# Patient Record
Sex: FEMALE | Race: ASIAN | NOT HISPANIC OR LATINO | URBAN - METROPOLITAN AREA
[De-identification: names, ages, dates, MRNs, and addresses within clinical notes are randomized per-mention and may not be internally consistent; named-entity substitution may affect disease eponyms.]

---

## 2017-02-06 ENCOUNTER — OUTPATIENT (OUTPATIENT)
Dept: OUTPATIENT SERVICES | Facility: HOSPITAL | Age: 52
LOS: 1 days | End: 2017-02-06
Payer: COMMERCIAL

## 2017-02-06 ENCOUNTER — APPOINTMENT (OUTPATIENT)
Dept: MAMMOGRAPHY | Facility: IMAGING CENTER | Age: 52
End: 2017-02-06

## 2017-02-06 DIAGNOSIS — J33.0 POLYP OF NASAL CAVITY: Chronic | ICD-10-CM

## 2017-02-06 DIAGNOSIS — Z12.31 ENCOUNTER FOR SCREENING MAMMOGRAM FOR MALIGNANT NEOPLASM OF BREAST: ICD-10-CM

## 2017-02-06 PROCEDURE — 77067 SCR MAMMO BI INCL CAD: CPT

## 2017-02-06 PROCEDURE — 77063 BREAST TOMOSYNTHESIS BI: CPT

## 2017-04-26 ENCOUNTER — APPOINTMENT (OUTPATIENT)
Dept: RADIOLOGY | Facility: CLINIC | Age: 52
End: 2017-04-26

## 2017-04-26 ENCOUNTER — OUTPATIENT (OUTPATIENT)
Dept: OUTPATIENT SERVICES | Facility: HOSPITAL | Age: 52
LOS: 1 days | End: 2017-04-26
Payer: COMMERCIAL

## 2017-04-26 DIAGNOSIS — J33.0 POLYP OF NASAL CAVITY: Chronic | ICD-10-CM

## 2017-04-26 DIAGNOSIS — Z00.8 ENCOUNTER FOR OTHER GENERAL EXAMINATION: ICD-10-CM

## 2017-04-26 PROCEDURE — 77080 DXA BONE DENSITY AXIAL: CPT

## 2018-01-25 ENCOUNTER — APPOINTMENT (OUTPATIENT)
Dept: RADIOLOGY | Facility: CLINIC | Age: 53
End: 2018-01-25
Payer: COMMERCIAL

## 2018-01-25 ENCOUNTER — OUTPATIENT (OUTPATIENT)
Dept: OUTPATIENT SERVICES | Facility: HOSPITAL | Age: 53
LOS: 1 days | End: 2018-01-25
Payer: COMMERCIAL

## 2018-01-25 DIAGNOSIS — Z00.8 ENCOUNTER FOR OTHER GENERAL EXAMINATION: ICD-10-CM

## 2018-01-25 DIAGNOSIS — J33.0 POLYP OF NASAL CAVITY: Chronic | ICD-10-CM

## 2018-01-25 PROCEDURE — 71046 X-RAY EXAM CHEST 2 VIEWS: CPT

## 2018-01-25 PROCEDURE — 71046 X-RAY EXAM CHEST 2 VIEWS: CPT | Mod: 26

## 2018-02-08 ENCOUNTER — TRANSCRIPTION ENCOUNTER (OUTPATIENT)
Age: 53
End: 2018-02-08

## 2018-02-10 ENCOUNTER — APPOINTMENT (OUTPATIENT)
Dept: MAMMOGRAPHY | Facility: CLINIC | Age: 53
End: 2018-02-10
Payer: COMMERCIAL

## 2018-02-10 ENCOUNTER — OUTPATIENT (OUTPATIENT)
Dept: OUTPATIENT SERVICES | Facility: HOSPITAL | Age: 53
LOS: 1 days | End: 2018-02-10
Payer: COMMERCIAL

## 2018-02-10 DIAGNOSIS — J33.0 POLYP OF NASAL CAVITY: Chronic | ICD-10-CM

## 2018-02-10 DIAGNOSIS — Z12.31 ENCOUNTER FOR SCREENING MAMMOGRAM FOR MALIGNANT NEOPLASM OF BREAST: ICD-10-CM

## 2018-02-10 PROCEDURE — 77067 SCR MAMMO BI INCL CAD: CPT | Mod: 26

## 2018-02-10 PROCEDURE — 77063 BREAST TOMOSYNTHESIS BI: CPT | Mod: 26

## 2018-02-10 PROCEDURE — 77063 BREAST TOMOSYNTHESIS BI: CPT

## 2018-02-10 PROCEDURE — 77067 SCR MAMMO BI INCL CAD: CPT

## 2018-11-20 ENCOUNTER — APPOINTMENT (OUTPATIENT)
Dept: ULTRASOUND IMAGING | Facility: CLINIC | Age: 53
End: 2018-11-20
Payer: COMMERCIAL

## 2018-11-20 ENCOUNTER — OUTPATIENT (OUTPATIENT)
Dept: OUTPATIENT SERVICES | Facility: HOSPITAL | Age: 53
LOS: 1 days | End: 2018-11-20
Payer: COMMERCIAL

## 2018-11-20 DIAGNOSIS — E04.2 NONTOXIC MULTINODULAR GOITER: ICD-10-CM

## 2018-11-20 DIAGNOSIS — J33.0 POLYP OF NASAL CAVITY: Chronic | ICD-10-CM

## 2018-11-20 PROCEDURE — 76536 US EXAM OF HEAD AND NECK: CPT | Mod: 26

## 2018-11-20 PROCEDURE — 76536 US EXAM OF HEAD AND NECK: CPT

## 2019-02-15 ENCOUNTER — OUTPATIENT (OUTPATIENT)
Dept: OUTPATIENT SERVICES | Facility: HOSPITAL | Age: 54
LOS: 1 days | End: 2019-02-15
Payer: COMMERCIAL

## 2019-02-15 ENCOUNTER — APPOINTMENT (OUTPATIENT)
Dept: MAMMOGRAPHY | Facility: CLINIC | Age: 54
End: 2019-02-15
Payer: COMMERCIAL

## 2019-02-15 DIAGNOSIS — J33.0 POLYP OF NASAL CAVITY: Chronic | ICD-10-CM

## 2019-02-15 DIAGNOSIS — Z00.8 ENCOUNTER FOR OTHER GENERAL EXAMINATION: ICD-10-CM

## 2019-02-15 PROCEDURE — 77063 BREAST TOMOSYNTHESIS BI: CPT

## 2019-02-15 PROCEDURE — 77063 BREAST TOMOSYNTHESIS BI: CPT | Mod: 26

## 2019-02-15 PROCEDURE — 77067 SCR MAMMO BI INCL CAD: CPT

## 2019-02-15 PROCEDURE — 77067 SCR MAMMO BI INCL CAD: CPT | Mod: 26

## 2020-02-18 ENCOUNTER — OUTPATIENT (OUTPATIENT)
Dept: OUTPATIENT SERVICES | Facility: HOSPITAL | Age: 55
LOS: 1 days | End: 2020-02-18
Payer: COMMERCIAL

## 2020-02-18 ENCOUNTER — APPOINTMENT (OUTPATIENT)
Dept: MAMMOGRAPHY | Facility: CLINIC | Age: 55
End: 2020-02-18
Payer: COMMERCIAL

## 2020-02-18 DIAGNOSIS — Z00.8 ENCOUNTER FOR OTHER GENERAL EXAMINATION: ICD-10-CM

## 2020-02-18 DIAGNOSIS — J33.0 POLYP OF NASAL CAVITY: Chronic | ICD-10-CM

## 2020-02-18 PROCEDURE — 77067 SCR MAMMO BI INCL CAD: CPT | Mod: 26

## 2020-02-18 PROCEDURE — 77067 SCR MAMMO BI INCL CAD: CPT

## 2020-02-18 PROCEDURE — 77063 BREAST TOMOSYNTHESIS BI: CPT | Mod: 26

## 2020-02-18 PROCEDURE — 77063 BREAST TOMOSYNTHESIS BI: CPT

## 2020-06-19 ENCOUNTER — APPOINTMENT (OUTPATIENT)
Dept: ULTRASOUND IMAGING | Facility: CLINIC | Age: 55
End: 2020-06-19
Payer: COMMERCIAL

## 2020-06-19 ENCOUNTER — OUTPATIENT (OUTPATIENT)
Dept: OUTPATIENT SERVICES | Facility: HOSPITAL | Age: 55
LOS: 1 days | End: 2020-06-19
Payer: COMMERCIAL

## 2020-06-19 DIAGNOSIS — Z00.8 ENCOUNTER FOR OTHER GENERAL EXAMINATION: ICD-10-CM

## 2020-06-19 DIAGNOSIS — J33.0 POLYP OF NASAL CAVITY: Chronic | ICD-10-CM

## 2020-06-19 PROCEDURE — 76700 US EXAM ABDOM COMPLETE: CPT | Mod: 26

## 2020-06-19 PROCEDURE — 76700 US EXAM ABDOM COMPLETE: CPT

## 2021-03-20 ENCOUNTER — APPOINTMENT (OUTPATIENT)
Dept: MAMMOGRAPHY | Facility: CLINIC | Age: 56
End: 2021-03-20
Payer: COMMERCIAL

## 2021-03-20 ENCOUNTER — OUTPATIENT (OUTPATIENT)
Dept: OUTPATIENT SERVICES | Facility: HOSPITAL | Age: 56
LOS: 1 days | End: 2021-03-20
Payer: COMMERCIAL

## 2021-03-20 DIAGNOSIS — Z00.8 ENCOUNTER FOR OTHER GENERAL EXAMINATION: ICD-10-CM

## 2021-03-20 DIAGNOSIS — J33.0 POLYP OF NASAL CAVITY: Chronic | ICD-10-CM

## 2021-03-20 PROCEDURE — 77067 SCR MAMMO BI INCL CAD: CPT

## 2021-03-20 PROCEDURE — 77067 SCR MAMMO BI INCL CAD: CPT | Mod: 26

## 2021-03-20 PROCEDURE — 77063 BREAST TOMOSYNTHESIS BI: CPT

## 2021-03-20 PROCEDURE — 77063 BREAST TOMOSYNTHESIS BI: CPT | Mod: 26

## 2021-12-16 ENCOUNTER — APPOINTMENT (OUTPATIENT)
Dept: SURGERY | Facility: CLINIC | Age: 56
End: 2021-12-16
Payer: COMMERCIAL

## 2021-12-16 VITALS
WEIGHT: 164 LBS | HEIGHT: 64 IN | DIASTOLIC BLOOD PRESSURE: 90 MMHG | SYSTOLIC BLOOD PRESSURE: 133 MMHG | BODY MASS INDEX: 28 KG/M2 | HEART RATE: 98 BPM

## 2021-12-16 DIAGNOSIS — Z80.3 FAMILY HISTORY OF MALIGNANT NEOPLASM OF BREAST: ICD-10-CM

## 2021-12-16 DIAGNOSIS — Z86.39 PERSONAL HISTORY OF OTHER ENDOCRINE, NUTRITIONAL AND METABOLIC DISEASE: ICD-10-CM

## 2021-12-16 DIAGNOSIS — Z80.8 FAMILY HISTORY OF MALIGNANT NEOPLASM OF OTHER ORGANS OR SYSTEMS: ICD-10-CM

## 2021-12-16 DIAGNOSIS — Z86.79 PERSONAL HISTORY OF OTHER DISEASES OF THE CIRCULATORY SYSTEM: ICD-10-CM

## 2021-12-16 DIAGNOSIS — Z78.9 OTHER SPECIFIED HEALTH STATUS: ICD-10-CM

## 2021-12-16 DIAGNOSIS — Z87.09 PERSONAL HISTORY OF OTHER DISEASES OF THE RESPIRATORY SYSTEM: ICD-10-CM

## 2021-12-16 PROCEDURE — 99203 OFFICE O/P NEW LOW 30 MIN: CPT

## 2021-12-16 RX ORDER — GLIMEPIRIDE 2 MG/1
2 TABLET ORAL
Refills: 0 | Status: ACTIVE | COMMUNITY

## 2021-12-16 RX ORDER — FLUTICASONE FUROATE AND VILANTEROL TRIFENATATE 200; 25 UG/1; UG/1
200-25 POWDER RESPIRATORY (INHALATION)
Refills: 0 | Status: ACTIVE | COMMUNITY

## 2021-12-16 RX ORDER — SIMVASTATIN 10 MG/1
10 TABLET, FILM COATED ORAL
Refills: 0 | Status: ACTIVE | COMMUNITY

## 2021-12-16 RX ORDER — ALENDRONATE SODIUM 70 MG/1
70 TABLET ORAL
Refills: 0 | Status: ACTIVE | COMMUNITY

## 2021-12-16 RX ORDER — QUINAPRIL HYDROCHLORIDE 20 MG/1
20 TABLET, FILM COATED ORAL
Refills: 0 | Status: ACTIVE | COMMUNITY

## 2021-12-16 RX ORDER — MONTELUKAST SODIUM 10 MG/1
10 TABLET, FILM COATED ORAL
Refills: 0 | Status: ACTIVE | COMMUNITY

## 2021-12-16 RX ORDER — EMPAGLIFLOZIN 10 MG/1
10 TABLET, FILM COATED ORAL
Refills: 0 | Status: ACTIVE | COMMUNITY

## 2021-12-16 RX ORDER — HYDROCHLOROTHIAZIDE 12.5 MG/1
12.5 CAPSULE ORAL
Refills: 0 | Status: ACTIVE | COMMUNITY

## 2021-12-16 RX ORDER — AMLODIPINE BESYLATE 5 MG/1
5 TABLET ORAL
Refills: 0 | Status: ACTIVE | COMMUNITY

## 2021-12-16 RX ORDER — SITAGLIPTIN AND METFORMIN HYDROCHLORIDE 50; 1000 MG/1; MG/1
50-1000 TABLET, FILM COATED, EXTENDED RELEASE ORAL
Refills: 0 | Status: ACTIVE | COMMUNITY

## 2021-12-18 NOTE — PHYSICAL EXAM
[de-identified] : no palpable thyroid nodules [Laryngoscopy Performed] : laryngoscopy was performed, see procedure section for findings [Midline] : located in midline position [Normal] : orientation to person, place, and time: normal [de-identified] : 4 mm left preauricular mass, well circumscribed, oblong, mobile [de-identified] : indirect  laryngoscopy shows normal vocal cord mobility bilaterally with no lesions noted

## 2021-12-18 NOTE — HISTORY OF PRESENT ILLNESS
[de-identified] : Pt c/o parotid mass for 1 month which has decreased in size.  denies pain,  dysphagia or dry mouth, fever, night sweats, skin cancer excision, infections. \par sonogram:  left parotid mass 9 mm\par MRI:  left 6 mm parotid mass\par I have reviewed all old and new data and available images.\par

## 2021-12-18 NOTE — CONSULT LETTER
[Dear  ___] : Dear  [unfilled], [Consult Letter:] : I had the pleasure of evaluating your patient, [unfilled]. [Please see my note below.] : Please see my note below. [Consult Closing:] : Thank you very much for allowing me to participate in the care of this patient.  If you have any questions, please do not hesitate to contact me. [Sincerely,] : Sincerely, [FreeTextEntry2] : Dr. Fabrice Neri [FreeTextEntry3] : Abdelrahman Kimble MD, FACS\par System Director, Endocrine Surgery\par [facility]\par Assistant Clinical Professor of Surgery\par [employer] [facility] [employer] [facility]

## 2021-12-18 NOTE — ASSESSMENT
[FreeTextEntry1] : recommended observation.  nodule is probably  too small to perform fine needle aspiration cytology. requested sonogram next visit. to return earlier if any change. to apply warm compresses. patient has been given the opportunity to ask questions, and all of the patient's questions have been answered to their satisfaction

## 2022-02-26 ENCOUNTER — APPOINTMENT (OUTPATIENT)
Dept: ULTRASOUND IMAGING | Facility: CLINIC | Age: 57
End: 2022-02-26
Payer: COMMERCIAL

## 2022-02-26 PROCEDURE — 76536 US EXAM OF HEAD AND NECK: CPT

## 2022-03-17 ENCOUNTER — APPOINTMENT (OUTPATIENT)
Dept: SURGERY | Facility: CLINIC | Age: 57
End: 2022-03-17

## 2022-03-22 ENCOUNTER — APPOINTMENT (OUTPATIENT)
Dept: MAMMOGRAPHY | Facility: CLINIC | Age: 57
End: 2022-03-22

## 2022-03-31 ENCOUNTER — APPOINTMENT (OUTPATIENT)
Dept: SURGERY | Facility: CLINIC | Age: 57
End: 2022-03-31
Payer: COMMERCIAL

## 2022-03-31 DIAGNOSIS — K11.8 OTHER DISEASES OF SALIVARY GLANDS: ICD-10-CM

## 2022-03-31 PROCEDURE — 99442: CPT

## 2022-03-31 NOTE — REASON FOR VISIT
[Home] : at home, [unfilled] , at the time of the visit. [Medical Office: ([facility])___] : at the medical office located in  [Family Member] : family member [Verbal consent obtained from patient] : the patient, [unfilled] [FreeTextEntry3] : daughter - Elizabeth [Follow-Up: _____] : a [unfilled] follow-up visit

## 2022-03-31 NOTE — ASSESSMENT
[FreeTextEntry1] : suspect intraparotid node which has since resolved. no intervention necessary.  to return as needed. patient has been given the opportunity to ask questions, and all of the patient's questions have been answered to their satisfaction\par

## 2022-03-31 NOTE — HISTORY OF PRESENT ILLNESS
[de-identified] : t/c from pt inquiring about telehealth to avoid face to face visit. before the visit, the patient and daughter were informed about the limitations and ramifications of telehealth and the potential for HIPAA non compliance.  the patient was at home and daughter was present on the call.  they then agreed to proceed with the visit.\par prior evaluation of parotid mass.  has decreased in size and is no longer palpable.  no changes medically since last visit.  no infections of symptoms related to salivary gland. I have reviewed all old and new data and available images.  Additional information was obtained from others present at the time of visit to ensure the completeness of the history. sonogram does not show any masses\par \par

## 2022-05-05 ENCOUNTER — OUTPATIENT (OUTPATIENT)
Dept: OUTPATIENT SERVICES | Facility: HOSPITAL | Age: 57
LOS: 1 days | End: 2022-05-05
Payer: COMMERCIAL

## 2022-05-05 ENCOUNTER — APPOINTMENT (OUTPATIENT)
Dept: MAMMOGRAPHY | Facility: CLINIC | Age: 57
End: 2022-05-05
Payer: COMMERCIAL

## 2022-05-05 DIAGNOSIS — J33.0 POLYP OF NASAL CAVITY: Chronic | ICD-10-CM

## 2022-05-05 DIAGNOSIS — Z00.00 ENCOUNTER FOR GENERAL ADULT MEDICAL EXAMINATION WITHOUT ABNORMAL FINDINGS: ICD-10-CM

## 2022-05-05 PROCEDURE — 77063 BREAST TOMOSYNTHESIS BI: CPT | Mod: 26

## 2022-05-05 PROCEDURE — 77067 SCR MAMMO BI INCL CAD: CPT

## 2022-05-05 PROCEDURE — 77063 BREAST TOMOSYNTHESIS BI: CPT

## 2022-05-05 PROCEDURE — 77067 SCR MAMMO BI INCL CAD: CPT | Mod: 26

## 2022-11-19 ENCOUNTER — OFFICE (OUTPATIENT)
Dept: URBAN - METROPOLITAN AREA CLINIC 35 | Facility: CLINIC | Age: 57
Setting detail: OPHTHALMOLOGY
End: 2022-11-19
Payer: COMMERCIAL

## 2022-11-19 DIAGNOSIS — H11.153: ICD-10-CM

## 2022-11-19 DIAGNOSIS — E11.9: ICD-10-CM

## 2022-11-19 DIAGNOSIS — H25.13: ICD-10-CM

## 2022-11-19 DIAGNOSIS — H16.221: ICD-10-CM

## 2022-11-19 DIAGNOSIS — H40.013: ICD-10-CM

## 2022-11-19 DIAGNOSIS — D31.02: ICD-10-CM

## 2022-11-19 PROCEDURE — 92014 COMPRE OPH EXAM EST PT 1/>: CPT | Performed by: OPHTHALMOLOGY

## 2022-11-19 PROCEDURE — 92083 EXTENDED VISUAL FIELD XM: CPT | Performed by: OPHTHALMOLOGY

## 2022-11-19 PROCEDURE — 92133 CPTRZD OPH DX IMG PST SGM ON: CPT | Performed by: OPHTHALMOLOGY

## 2022-11-19 ASSESSMENT — KERATOMETRY
OD_AXISANGLE_DEGREES: 013
OS_K1POWER_DIOPTERS: 43.25
OD_K1POWER_DIOPTERS: 43.25
OD_K2POWER_DIOPTERS: 44.00
OS_AXISANGLE_DEGREES: 090
OS_K2POWER_DIOPTERS: 43.25

## 2022-11-19 ASSESSMENT — SPHEQUIV_DERIVED
OD_SPHEQUIV: -0.25
OS_SPHEQUIV: -0.375
OS_SPHEQUIV: -0.375
OS_SPHEQUIV: -0.125
OD_SPHEQUIV: 0
OS_SPHEQUIV: 0.125
OD_SPHEQUIV: -0.25
OD_SPHEQUIV: -0.75
OD_SPHEQUIV: -0.375
OS_SPHEQUIV: -0.25
OD_SPHEQUIV: -0.5
OS_SPHEQUIV: -0.125

## 2022-11-19 ASSESSMENT — REFRACTION_CURRENTRX
OS_SPHERE: +0.50
OD_CYLINDER: -1.50
OD_OVR_VA: 20/
OS_CYLINDER: -1.00
OD_CYLINDER: -1.00
OD_ADD: +2.50
OS_AXIS: 098
OS_OVR_VA: 20/
OS_AXIS: 079
OD_VPRISM_DIRECTION: SV
OS_CYLINDER: -1.25
OS_SPHERE: +0.50
OD_SPHERE: +0.25
OD_VPRISM_DIRECTION: PROGS
OD_SPHERE: +0.50
OS_VPRISM_DIRECTION: SV
OS_VPRISM_DIRECTION: PROGS
OD_AXIS: 088
OS_ADD: +2.50
OD_OVR_VA: 20/
OS_OVR_VA: 20/
OD_AXIS: 089

## 2022-11-19 ASSESSMENT — REFRACTION_MANIFEST
OD_AXIS: 090
OS_SPHERE: +0.50
OD_AXIS: 090
OD_VA1: 20/30+
OS_VA1: 20/50+2
OS_SPHERE: +1.00
OS_AXIS: 100
OS_SPHERE: +0.50
OD_VA1: 20/20-1
OD_SPHERE: +0.50
OD_SPHERE: +0.50
OS_AXIS: 100
OD_VA1: 20/25
OD_CYLINDER: -1.00
OD_VA1: 20/25
OD_SPHERE: -0.25
OS_AXIS: 092
OS_SPHERE: +0.50
OS_ADD: +2.50
OS_AXIS: 000
OS_CYLINDER: -1.25
OD_ADD: +2.50
OD_CYLINDER: -1.50
OS_SPHERE: -1.25
OD_CYLINDER: +2.00
OS_CYLINDER: -1.50
OS_AXIS: 090
OD_SPHERE: -1.50
OD_SPHERE: +0.50
OD_AXIS: 094
OD_CYLINDER: -1.00
OD_CYLINDER: -1.50
OD_AXIS: 90
OS_CYLINDER: -1.25
OS_VA1: 20/25-2
OS_CYLINDER: -1.75
OS_VA1: 20/25-
OS_CYLINDER: +1.75
OS_VA1: 20/20
OD_AXIS: 008

## 2022-11-19 ASSESSMENT — PACHYMETRY
OD_CT_UM: 570
OS_CT_CORRECTION: -2
OS_CT_UM: 579
OD_CT_CORRECTION: -2

## 2022-11-19 ASSESSMENT — AXIALLENGTH_DERIVED
OS_AL: 23.7333
OD_AL: 23.5461
OD_AL: 23.6435
OD_AL: 23.6435
OD_AL: 23.7417
OD_AL: 23.6925
OS_AL: 23.8323
OS_AL: 23.7827
OS_AL: 23.8323
OD_AL: 23.8408
OS_AL: 23.6351
OS_AL: 23.7333

## 2022-11-19 ASSESSMENT — REFRACTION_AUTOREFRACTION
OS_SPHERE: +0.50
OD_CYLINDER: -1.75
OD_AXIS: 098
OS_AXIS: 091
OD_SPHERE: +0.50
OS_CYLINDER: -1.75

## 2022-11-19 ASSESSMENT — VISUAL ACUITY
OD_BCVA: 20/50-1
OS_BCVA: 20/30-1

## 2022-11-19 ASSESSMENT — CONFRONTATIONAL VISUAL FIELD TEST (CVF)
OS_FINDINGS: FULL
OD_FINDINGS: FULL

## 2022-11-19 ASSESSMENT — TONOMETRY
OD_IOP_MMHG: 20
OS_IOP_MMHG: 20

## 2023-03-18 ENCOUNTER — OFFICE (OUTPATIENT)
Dept: URBAN - METROPOLITAN AREA CLINIC 35 | Facility: CLINIC | Age: 58
Setting detail: OPHTHALMOLOGY
End: 2023-03-18
Payer: COMMERCIAL

## 2023-03-18 DIAGNOSIS — H11.153: ICD-10-CM

## 2023-03-18 DIAGNOSIS — H25.13: ICD-10-CM

## 2023-03-18 DIAGNOSIS — H17.9: ICD-10-CM

## 2023-03-18 DIAGNOSIS — H40.013: ICD-10-CM

## 2023-03-18 DIAGNOSIS — D31.02: ICD-10-CM

## 2023-03-18 PROCEDURE — 92012 INTRM OPH EXAM EST PATIENT: CPT | Performed by: OPHTHALMOLOGY

## 2023-03-18 ASSESSMENT — REFRACTION_CURRENTRX
OD_VPRISM_DIRECTION: SV
OS_OVR_VA: 20/
OD_VPRISM_DIRECTION: SV
OD_OVR_VA: 20/
OS_SPHERE: +0.25
OS_OVR_VA: 20/
OS_VPRISM_DIRECTION: SV
OS_AXIS: 077
OD_CYLINDER: -1.50
OS_VPRISM_DIRECTION: SV
OD_OVR_VA: 20/
OD_AXIS: 094
OS_CYLINDER: -1.25
OD_SPHERE: PLANO

## 2023-03-18 ASSESSMENT — REFRACTION_MANIFEST
OS_SPHERE: +1.00
OS_AXIS: 000
OD_AXIS: 090
OS_VA1: 20/25-
OD_SPHERE: -1.50
OS_ADD: +2.75
OD_CYLINDER: -1.75
OD_VA1: 20/20-1
OD_SPHERE: +0.50
OS_AXIS: 090
OS_CYLINDER: -1.25
OS_CYLINDER: -1.75
OD_AXIS: 090
OD_AXIS: 090
OS_SPHERE: +0.50
OS_CYLINDER: -1.50
OD_CYLINDER: -1.50
OD_SPHERE: +0.50
OS_AXIS: 100
OS_CYLINDER: +1.75
OS_VA1: 20/50+2
OD_VA1: 20/25
OD_CYLINDER: -1.00
OS_VA1: 20/20
OD_SPHERE: +0.50
OD_AXIS: 094
OS_AXIS: 090
OD_CYLINDER: -1.00
OS_SPHERE: +0.50
OD_AXIS: 008
OD_VA1: 20/25-2
OD_ADD: +2.75
OD_VA1: 20/30+
OD_SPHERE: -0.25
OS_SPHERE: +0.50
OS_VA1: 20/25-2
OS_AXIS: 100
OS_CYLINDER: -1.25
OS_ADD: +2.50
OD_CYLINDER: -1.50
OS_CYLINDER: -1.50
OD_ADD: +2.50
OS_SPHERE: -1.25
OD_VA1: 20/25
OD_CYLINDER: +2.00
OS_AXIS: 092
OD_AXIS: 90
OS_SPHERE: +0.50
OS_VA1: 20/30-2
OD_SPHERE: +0.50

## 2023-03-18 ASSESSMENT — AXIALLENGTH_DERIVED
OD_AL: 23.9349
OS_AL: 23.7827
OS_AL: 23.7333
OS_AL: 23.8323
OD_AL: 23.7855
OS_AL: 23.6351
OS_AL: 23.7827
OD_AL: 23.6379
OS_AL: 23.7827
OD_AL: 23.7361
OD_AL: 23.7855
OS_AL: 23.7333
OD_AL: 23.7361
OD_AL: 23.8351

## 2023-03-18 ASSESSMENT — KERATOMETRY
OD_K2POWER_DIOPTERS: 44.00
OD_AXISANGLE_DEGREES: 006
OD_K1POWER_DIOPTERS: 42.75
OS_K1POWER_DIOPTERS: 43.25
OS_AXISANGLE_DEGREES: 090
OS_K2POWER_DIOPTERS: 43.25

## 2023-03-18 ASSESSMENT — REFRACTION_AUTOREFRACTION
OD_AXIS: 091
OS_AXIS: 089
OS_SPHERE: +0.50
OS_CYLINDER: -1.50
OD_SPHERE: +0.75
OD_CYLINDER: -2.25

## 2023-03-18 ASSESSMENT — SPHEQUIV_DERIVED
OS_SPHEQUIV: -0.375
OD_SPHEQUIV: 0
OD_SPHEQUIV: -0.75
OD_SPHEQUIV: -0.25
OS_SPHEQUIV: -0.25
OD_SPHEQUIV: -0.375
OS_SPHEQUIV: -0.25
OD_SPHEQUIV: -0.25
OD_SPHEQUIV: -0.375
OD_SPHEQUIV: -0.5
OS_SPHEQUIV: -0.25
OS_SPHEQUIV: 0.125
OS_SPHEQUIV: -0.125
OS_SPHEQUIV: -0.125

## 2023-03-18 ASSESSMENT — PACHYMETRY
OD_CT_UM: 570
OD_CT_CORRECTION: -2
OS_CT_CORRECTION: -2
OS_CT_UM: 579

## 2023-03-18 ASSESSMENT — TONOMETRY
OS_IOP_MMHG: 18
OD_IOP_MMHG: 17

## 2023-03-18 ASSESSMENT — VISUAL ACUITY
OS_BCVA: 20/50
OD_BCVA: 20/50-2

## 2023-03-18 ASSESSMENT — CORNEAL TRAUMA - ABRASION: OD_ABRASION: ABSENT

## 2023-06-29 ENCOUNTER — OUTPATIENT (OUTPATIENT)
Dept: OUTPATIENT SERVICES | Facility: HOSPITAL | Age: 58
LOS: 1 days | End: 2023-06-29
Payer: COMMERCIAL

## 2023-06-29 ENCOUNTER — APPOINTMENT (OUTPATIENT)
Dept: MAMMOGRAPHY | Facility: CLINIC | Age: 58
End: 2023-06-29
Payer: COMMERCIAL

## 2023-06-29 ENCOUNTER — APPOINTMENT (OUTPATIENT)
Dept: ULTRASOUND IMAGING | Facility: CLINIC | Age: 58
End: 2023-06-29

## 2023-06-29 ENCOUNTER — APPOINTMENT (OUTPATIENT)
Dept: RADIOLOGY | Facility: CLINIC | Age: 58
End: 2023-06-29

## 2023-06-29 DIAGNOSIS — J33.0 POLYP OF NASAL CAVITY: Chronic | ICD-10-CM

## 2023-06-29 DIAGNOSIS — Z00.8 ENCOUNTER FOR OTHER GENERAL EXAMINATION: ICD-10-CM

## 2023-06-29 DIAGNOSIS — M81.0 AGE-RELATED OSTEOPOROSIS WITHOUT CURRENT PATHOLOGICAL FRACTURE: ICD-10-CM

## 2023-06-29 PROCEDURE — 77080 DXA BONE DENSITY AXIAL: CPT

## 2023-06-29 PROCEDURE — 77080 DXA BONE DENSITY AXIAL: CPT | Mod: 26

## 2023-06-29 PROCEDURE — 77063 BREAST TOMOSYNTHESIS BI: CPT

## 2023-06-29 PROCEDURE — 77067 SCR MAMMO BI INCL CAD: CPT

## 2023-06-29 PROCEDURE — 77067 SCR MAMMO BI INCL CAD: CPT | Mod: 26

## 2023-06-29 PROCEDURE — 76700 US EXAM ABDOM COMPLETE: CPT

## 2023-06-29 PROCEDURE — 77063 BREAST TOMOSYNTHESIS BI: CPT | Mod: 26

## 2023-06-29 PROCEDURE — 76700 US EXAM ABDOM COMPLETE: CPT | Mod: 26

## 2023-07-01 ENCOUNTER — OFFICE (OUTPATIENT)
Dept: URBAN - METROPOLITAN AREA CLINIC 35 | Facility: CLINIC | Age: 58
Setting detail: OPHTHALMOLOGY
End: 2023-07-01
Payer: COMMERCIAL

## 2023-07-01 DIAGNOSIS — H17.9: ICD-10-CM

## 2023-07-01 DIAGNOSIS — H40.013: ICD-10-CM

## 2023-07-01 DIAGNOSIS — E11.9: ICD-10-CM

## 2023-07-01 DIAGNOSIS — H52.03: ICD-10-CM

## 2023-07-01 DIAGNOSIS — H25.13: ICD-10-CM

## 2023-07-01 DIAGNOSIS — D31.02: ICD-10-CM

## 2023-07-01 DIAGNOSIS — H52.7: ICD-10-CM

## 2023-07-01 DIAGNOSIS — H11.153: ICD-10-CM

## 2023-07-01 PROCEDURE — 92015 DETERMINE REFRACTIVE STATE: CPT | Performed by: OPHTHALMOLOGY

## 2023-07-01 PROCEDURE — 99214 OFFICE O/P EST MOD 30 MIN: CPT | Performed by: OPHTHALMOLOGY

## 2023-07-01 PROCEDURE — 92250 FUNDUS PHOTOGRAPHY W/I&R: CPT | Performed by: OPHTHALMOLOGY

## 2023-07-01 ASSESSMENT — REFRACTION_CURRENTRX
OD_OVR_VA: 20/
OS_OVR_VA: 20/
OD_OVR_VA: 20/
OD_AXIS: 094
OS_CYLINDER: -1.25
OD_VPRISM_DIRECTION: SV
OS_AXIS: 077
OD_AXIS: 095
OD_CYLINDER: -1.50
OS_VPRISM_DIRECTION: SV
OD_CYLINDER: -1.00
OD_SPHERE: +0.50
OD_SPHERE: PLANO
OS_AXIS: 095
OD_VPRISM_DIRECTION: SV
OS_SPHERE: +0.50
OS_SPHERE: +0.25
OS_VPRISM_DIRECTION: SV
OS_CYLINDER: -1.25
OS_OVR_VA: 20/

## 2023-07-01 ASSESSMENT — AXIALLENGTH_DERIVED
OD_AL: 23.6379
OD_AL: 23.7855
OD_AL: 23.7855
OS_AL: 23.7333
OS_AL: 23.7827
OD_AL: 23.9349
OD_AL: 23.8351
OD_AL: 23.7855
OS_AL: 23.7827
OS_AL: 23.6351
OS_AL: 23.7333
OS_AL: 23.8323
OD_AL: 23.7361
OD_AL: 23.7361
OS_AL: 23.7827
OS_AL: 23.7827

## 2023-07-01 ASSESSMENT — REFRACTION_AUTOREFRACTION
OD_SPHERE: +0.75
OD_CYLINDER: -2.25
OS_CYLINDER: -1.50
OS_AXIS: 089
OS_SPHERE: +0.50
OD_AXIS: 091

## 2023-07-01 ASSESSMENT — SPHEQUIV_DERIVED
OD_SPHEQUIV: -0.25
OS_SPHEQUIV: -0.125
OS_SPHEQUIV: -0.25
OS_SPHEQUIV: -0.25
OD_SPHEQUIV: -0.375
OS_SPHEQUIV: -0.375
OS_SPHEQUIV: -0.125
OS_SPHEQUIV: -0.25
OD_SPHEQUIV: -0.375
OD_SPHEQUIV: -0.375
OD_SPHEQUIV: -0.5
OS_SPHEQUIV: 0.125
OD_SPHEQUIV: -0.75
OD_SPHEQUIV: 0
OD_SPHEQUIV: -0.25
OS_SPHEQUIV: -0.25

## 2023-07-01 ASSESSMENT — CORNEAL TRAUMA - ABRASION: OD_ABRASION: ABSENT

## 2023-07-01 ASSESSMENT — REFRACTION_MANIFEST
OD_AXIS: 008
OS_AXIS: 000
OD_AXIS: 090
OD_SPHERE: +0.50
OD_CYLINDER: -1.50
OD_AXIS: 090
OS_VA1: 20/50+2
OD_SPHERE: -0.25
OD_SPHERE: +0.50
OD_VA1: 20/30+
OD_CYLINDER: -1.00
OS_SPHERE: +0.50
OS_AXIS: 090
OD_VA1: 20/25
OD_AXIS: 090
OD_AXIS: 090
OS_ADD: +2.50
OS_CYLINDER: -1.25
OS_CYLINDER: -1.75
OS_SPHERE: -1.25
OD_SPHERE: -1.50
OD_VA1: 20/25-+2
OS_SPHERE: +0.50
OS_VA1: 20/25-2
OD_AXIS: 90
OS_AXIS: 090
OS_SPHERE: +0.50
OS_SPHERE: +0.50
OD_ADD: +2.75
OS_ADD: +2.75
OD_SPHERE: +0.50
OD_CYLINDER: -1.00
OD_ADD: +2.75
OS_AXIS: 100
OS_VA1: 20/20
OS_ADD: +2.75
OD_VA1: 20/25-2
OD_VA1: 20/25
OS_CYLINDER: -1.50
OD_CYLINDER: +2.00
OD_VA1: 20/20-1
OS_AXIS: 092
OD_ADD: +2.50
OD_CYLINDER: -1.75
OD_SPHERE: +0.50
OS_SPHERE: +0.50
OD_CYLINDER: -1.75
OD_SPHERE: +0.50
OS_SPHERE: +1.00
OS_VA1: 20/25-
OD_CYLINDER: -1.50
OS_CYLINDER: -1.25
OS_CYLINDER: -1.50
OS_VA1: 20/60+3
OS_VA1: 20/30-2
OD_AXIS: 094
OS_AXIS: 090
OS_CYLINDER: -1.50
OS_CYLINDER: +1.75
OS_AXIS: 100

## 2023-07-01 ASSESSMENT — KERATOMETRY
OD_K2POWER_DIOPTERS: 44.00
OD_AXISANGLE_DEGREES: 006
OD_K1POWER_DIOPTERS: 42.75
OS_K2POWER_DIOPTERS: 43.25
OS_AXISANGLE_DEGREES: 090
OS_K1POWER_DIOPTERS: 43.25

## 2023-07-01 ASSESSMENT — CONFRONTATIONAL VISUAL FIELD TEST (CVF)
OS_FINDINGS: FULL
OD_FINDINGS: FULL

## 2023-07-01 ASSESSMENT — VISUAL ACUITY
OS_BCVA: 20/40+2
OD_BCVA: 20/60+2

## 2023-07-01 ASSESSMENT — TONOMETRY
OS_IOP_MMHG: 17
OD_IOP_MMHG: 17

## 2023-07-01 ASSESSMENT — PACHYMETRY
OS_CT_UM: 579
OD_CT_UM: 570
OD_CT_CORRECTION: -2
OS_CT_CORRECTION: -2

## 2023-08-23 ENCOUNTER — NON-APPOINTMENT (OUTPATIENT)
Age: 58
End: 2023-08-23

## 2024-06-05 ENCOUNTER — APPOINTMENT (OUTPATIENT)
Dept: ORTHOPEDIC SURGERY | Facility: CLINIC | Age: 59
End: 2024-06-05

## 2024-07-06 ENCOUNTER — APPOINTMENT (OUTPATIENT)
Dept: MAMMOGRAPHY | Facility: CLINIC | Age: 59
End: 2024-07-06
Payer: COMMERCIAL

## 2024-07-06 ENCOUNTER — OUTPATIENT (OUTPATIENT)
Dept: OUTPATIENT SERVICES | Facility: HOSPITAL | Age: 59
LOS: 1 days | End: 2024-07-06
Payer: COMMERCIAL

## 2024-07-06 DIAGNOSIS — Z12.31 ENCOUNTER FOR SCREENING MAMMOGRAM FOR MALIGNANT NEOPLASM OF BREAST: ICD-10-CM

## 2024-07-06 DIAGNOSIS — Z00.00 ENCOUNTER FOR GENERAL ADULT MEDICAL EXAMINATION WITHOUT ABNORMAL FINDINGS: ICD-10-CM

## 2024-07-06 DIAGNOSIS — J33.0 POLYP OF NASAL CAVITY: Chronic | ICD-10-CM

## 2024-07-06 PROCEDURE — 77063 BREAST TOMOSYNTHESIS BI: CPT

## 2024-07-06 PROCEDURE — 77067 SCR MAMMO BI INCL CAD: CPT

## 2024-07-06 PROCEDURE — 77063 BREAST TOMOSYNTHESIS BI: CPT | Mod: 26

## 2024-07-06 PROCEDURE — 77067 SCR MAMMO BI INCL CAD: CPT | Mod: 26

## 2024-11-09 ENCOUNTER — APPOINTMENT (OUTPATIENT)
Dept: ULTRASOUND IMAGING | Facility: CLINIC | Age: 59
End: 2024-11-09

## 2024-11-09 ENCOUNTER — OUTPATIENT (OUTPATIENT)
Dept: OUTPATIENT SERVICES | Facility: HOSPITAL | Age: 59
LOS: 1 days | End: 2024-11-09
Payer: COMMERCIAL

## 2024-11-09 DIAGNOSIS — J33.0 POLYP OF NASAL CAVITY: Chronic | ICD-10-CM

## 2024-11-09 DIAGNOSIS — Z00.8 ENCOUNTER FOR OTHER GENERAL EXAMINATION: ICD-10-CM

## 2024-11-09 PROCEDURE — 76700 US EXAM ABDOM COMPLETE: CPT

## 2024-11-09 PROCEDURE — 76700 US EXAM ABDOM COMPLETE: CPT | Mod: 26

## 2024-12-18 ENCOUNTER — APPOINTMENT (OUTPATIENT)
Dept: ORTHOPEDIC SURGERY | Facility: CLINIC | Age: 59
End: 2024-12-18
Payer: COMMERCIAL

## 2024-12-18 VITALS — BODY MASS INDEX: 28 KG/M2 | WEIGHT: 164 LBS | HEIGHT: 64 IN

## 2024-12-18 DIAGNOSIS — M72.0 PALMAR FASCIAL FIBROMATOSIS [DUPUYTREN]: ICD-10-CM

## 2024-12-18 DIAGNOSIS — E11.9 TYPE 2 DIABETES MELLITUS W/OUT COMPLICATIONS: ICD-10-CM

## 2024-12-18 PROCEDURE — 99203 OFFICE O/P NEW LOW 30 MIN: CPT

## 2024-12-18 PROCEDURE — 73130 X-RAY EXAM OF HAND: CPT | Mod: 50
